# Patient Record
Sex: MALE | URBAN - METROPOLITAN AREA
[De-identification: names, ages, dates, MRNs, and addresses within clinical notes are randomized per-mention and may not be internally consistent; named-entity substitution may affect disease eponyms.]

---

## 2017-06-08 ENCOUNTER — APPOINTMENT (RX ONLY)
Dept: URBAN - METROPOLITAN AREA CLINIC 17 | Facility: CLINIC | Age: 57
Setting detail: DERMATOLOGY
End: 2017-06-08

## 2017-06-08 DIAGNOSIS — Z71.89 OTHER SPECIFIED COUNSELING: ICD-10-CM

## 2017-06-08 DIAGNOSIS — L57.0 ACTINIC KERATOSIS: ICD-10-CM

## 2017-06-08 DIAGNOSIS — D18.0 HEMANGIOMA: ICD-10-CM

## 2017-06-08 DIAGNOSIS — L82.1 OTHER SEBORRHEIC KERATOSIS: ICD-10-CM

## 2017-06-08 DIAGNOSIS — L57.8 OTHER SKIN CHANGES DUE TO CHRONIC EXPOSURE TO NONIONIZING RADIATION: ICD-10-CM

## 2017-06-08 PROBLEM — L70.0 ACNE VULGARIS: Status: ACTIVE | Noted: 2017-06-08

## 2017-06-08 PROBLEM — D18.01 HEMANGIOMA OF SKIN AND SUBCUTANEOUS TISSUE: Status: ACTIVE | Noted: 2017-06-08

## 2017-06-08 PROCEDURE — 17000 DESTRUCT PREMALG LESION: CPT

## 2017-06-08 PROCEDURE — ? LIQUID NITROGEN

## 2017-06-08 PROCEDURE — 17003 DESTRUCT PREMALG LES 2-14: CPT

## 2017-06-08 PROCEDURE — ? COUNSELING

## 2017-06-08 PROCEDURE — 99203 OFFICE O/P NEW LOW 30 MIN: CPT | Mod: 25

## 2017-06-08 ASSESSMENT — LOCATION SIMPLE DESCRIPTION DERM
LOCATION SIMPLE: RIGHT EAR
LOCATION SIMPLE: LEFT EAR

## 2017-06-08 ASSESSMENT — TOTAL NUMBER OF LESIONS: # OF LESIONS?: 2

## 2017-06-08 ASSESSMENT — LOCATION DETAILED DESCRIPTION DERM
LOCATION DETAILED: LEFT CRUS OF HELIX
LOCATION DETAILED: RIGHT CYMBA CONCHA

## 2017-06-08 ASSESSMENT — LOCATION ZONE DERM: LOCATION ZONE: EAR

## 2017-06-08 NOTE — PROCEDURE: LIQUID NITROGEN
Post-Care Instructions: LIQUID NITROGEN TREATMENT\\n(Cryosurgery)\\n\\n Liquid Nitrogen (LN2) is a cold, liquefied gas with a temperature of 196 degrees below zero Celsius (minus 321 degrees Fahrenheit).  It is used to freeze and destroy superficial skin growths such as warts and keratoses.  It can also be used to treat pre-malignant skin lesions known as actinic keratoses.  LN2 causes stinging and mild pain while the growth is being frozen and then thaws.  The discomfort usually lasts less than fifteen minutes.  On the fingers a throbbing pain will occasionally last  a few hours.\\n After LN2 treatment your skin will become swollen and red and it may blister.  Then a scab will form.  It will fall off by itself in one to three weeks.  The skin growth will come off with the scab, leaving healthy, new skin.\\n Generally, no special care is needed after liquid nitrogen treatment.  Just ignore it.  You can wash your skin as usual and use make-up or other cosmetics.  If clothing irritates the area, cover it with a small bandage (Band-Aid).\\n If a very large or uncomfortable blister develops you may open it with a sterilized needle.  The needle can be sterilized by wiping with rubbing alcohol.  Gently clean the blistered area with soap and water, followed by alcohol.  Insert the needle into the edge of the blister as needed to allow the blister contents to escape.  Press the blister gently to force out the fluid or blood.  This will reduce pain caused by blister fluid pressure.  If a blister re-forms it may be opened again.  Do not remove the top of the blister since its presence helps prevent infection until the new skin beneath can replace it.  Protect open sores or punctured blisters with Polysporin or Bacitracin antibiotic ointment (available without a prescription) under a Band-Aid for 24-48 hours.\\n If you notice any signs of infection such as oozing of a discolored substance (pus), spreading redness, excessive swelling or increased pain usually appearing 2 days or more after the office treatment, please call the office.  Your usual pain reliever (such as Tylenol and aspirin) is normally sufficient to alleviate discomfort.  Intermittent use of an ice pack for 5 to 10 minutes each hour can also be used if needed.  If you have severe pain, please call the office.  Do not pick at crusted areas.  Allow them to come off on their own.\\n If a lesion was treated near your eye, you may notice swelling of one or both lids within 24 hours, so that your lids are partially closed.  This is harmless, will not affect your vision, and will resolve by itself in a day or two.\\n Sometimes LN2 fails or does not completely remove the growth.  This is especially true with larger warts, which often require more than a single treatment for cure.  If the growth is not cured with LN2 and you do not have a scheduled follow-up appointment for further treatment, please call to make a return appointment.
Detail Level: Simple
Consent: The patient's consent was obtained including but not limited to risks of crusting, scabbing, blistering, scarring, darker or lighter pigmentary change, recurrence, incomplete removal and infection.
Total Number Of Aks Treated: 2
Duration Of Freeze Thaw-Cycle (Seconds): 5
Render Post-Care Instructions In Note?: no

## 2018-06-06 ENCOUNTER — APPOINTMENT (RX ONLY)
Dept: URBAN - METROPOLITAN AREA CLINIC 17 | Facility: CLINIC | Age: 58
Setting detail: DERMATOLOGY
End: 2018-06-06

## 2018-06-06 DIAGNOSIS — Z71.89 OTHER SPECIFIED COUNSELING: ICD-10-CM

## 2018-06-06 DIAGNOSIS — L82.1 OTHER SEBORRHEIC KERATOSIS: ICD-10-CM

## 2018-06-06 DIAGNOSIS — L81.4 OTHER MELANIN HYPERPIGMENTATION: ICD-10-CM

## 2018-06-06 DIAGNOSIS — L57.0 ACTINIC KERATOSIS: ICD-10-CM

## 2018-06-06 DIAGNOSIS — D18.0 HEMANGIOMA: ICD-10-CM

## 2018-06-06 PROBLEM — D18.01 HEMANGIOMA OF SKIN AND SUBCUTANEOUS TISSUE: Status: ACTIVE | Noted: 2018-06-06

## 2018-06-06 PROBLEM — L23.7 ALLERGIC CONTACT DERMATITIS DUE TO PLANTS, EXCEPT FOOD: Status: ACTIVE | Noted: 2018-06-06

## 2018-06-06 PROCEDURE — ? COUNSELING

## 2018-06-06 PROCEDURE — ? LIQUID NITROGEN

## 2018-06-06 PROCEDURE — 99213 OFFICE O/P EST LOW 20 MIN: CPT | Mod: 25

## 2018-06-06 PROCEDURE — 17000 DESTRUCT PREMALG LESION: CPT

## 2018-06-06 ASSESSMENT — LOCATION ZONE DERM: LOCATION ZONE: EAR

## 2018-06-06 ASSESSMENT — LOCATION SIMPLE DESCRIPTION DERM: LOCATION SIMPLE: LEFT EAR

## 2018-06-06 ASSESSMENT — TOTAL NUMBER OF LESIONS: # OF LESIONS?: 1

## 2018-06-06 ASSESSMENT — LOCATION DETAILED DESCRIPTION DERM: LOCATION DETAILED: LEFT CRUS OF HELIX

## 2018-06-06 NOTE — PROCEDURE: LIQUID NITROGEN
Render Post-Care Instructions In Note?: no
Post-Care Instructions: LIQUID NITROGEN TREATMENT\\n(Cryosurgery)\\n\\n Liquid Nitrogen (LN2) is a cold, liquefied gas with a temperature of 196 degrees below zero Celsius (minus 321 degrees Fahrenheit).  It is used to freeze and destroy superficial skin growths such as warts and keratoses.  It can also be used to treat pre-malignant skin lesions known as actinic keratoses.  LN2 causes stinging and mild pain while the growth is being frozen and then thaws.  The discomfort usually lasts less than fifteen minutes.  On the fingers a throbbing pain will occasionally last  a few hours.\\n After LN2 treatment your skin will become swollen and red and it may blister.  Then a scab will form.  It will fall off by itself in one to three weeks.  The skin growth will come off with the scab, leaving healthy, new skin.\\n Generally, no special care is needed after liquid nitrogen treatment.  Just ignore it.  You can wash your skin as usual and use make-up or other cosmetics.  If clothing irritates the area, cover it with a small bandage (Band-Aid).\\n If a very large or uncomfortable blister develops you may open it with a sterilized needle.  The needle can be sterilized by wiping with rubbing alcohol.  Gently clean the blistered area with soap and water, followed by alcohol.  Insert the needle into the edge of the blister as needed to allow the blister contents to escape.  Press the blister gently to force out the fluid or blood.  This will reduce pain caused by blister fluid pressure.  If a blister re-forms it may be opened again.  Do not remove the top of the blister since its presence helps prevent infection until the new skin beneath can replace it.  Protect open sores or punctured blisters with Polysporin or Bacitracin antibiotic ointment (available without a prescription) under a Band-Aid for 24-48 hours.\\n If you notice any signs of infection such as oozing of a discolored substance (pus), spreading redness, excessive swelling or increased pain usually appearing 2 days or more after the office treatment, please call the office.  Your usual pain reliever (such as Tylenol and aspirin) is normally sufficient to alleviate discomfort.  Intermittent use of an ice pack for 5 to 10 minutes each hour can also be used if needed.  If you have severe pain, please call the office.  Do not pick at crusted areas.  Allow them to come off on their own.\\n If a lesion was treated near your eye, you may notice swelling of one or both lids within 24 hours, so that your lids are partially closed.  This is harmless, will not affect your vision, and will resolve by itself in a day or two.\\n Sometimes LN2 fails or does not completely remove the growth.  This is especially true with larger warts, which often require more than a single treatment for cure.  If the growth is not cured with LN2 and you do not have a scheduled follow-up appointment for further treatment, please call to make a return appointment.
Detail Level: Simple
Duration Of Freeze Thaw-Cycle (Seconds): 5
Consent: The patient's consent was obtained including but not limited to risks of crusting, scabbing, blistering, scarring, darker or lighter pigmentary change, recurrence, incomplete removal and infection.
Total Number Of Aks Treated: 1

## 2019-07-18 ENCOUNTER — APPOINTMENT (RX ONLY)
Dept: URBAN - METROPOLITAN AREA CLINIC 17 | Facility: CLINIC | Age: 59
Setting detail: DERMATOLOGY
End: 2019-07-18

## 2019-07-18 DIAGNOSIS — L82.1 OTHER SEBORRHEIC KERATOSIS: ICD-10-CM

## 2019-07-18 DIAGNOSIS — Z71.89 OTHER SPECIFIED COUNSELING: ICD-10-CM

## 2019-07-18 DIAGNOSIS — L57.0 ACTINIC KERATOSIS: ICD-10-CM

## 2019-07-18 DIAGNOSIS — L81.4 OTHER MELANIN HYPERPIGMENTATION: ICD-10-CM

## 2019-07-18 DIAGNOSIS — L40.8 OTHER PSORIASIS: ICD-10-CM

## 2019-07-18 DIAGNOSIS — D18.0 HEMANGIOMA: ICD-10-CM

## 2019-07-18 PROBLEM — D18.01 HEMANGIOMA OF SKIN AND SUBCUTANEOUS TISSUE: Status: ACTIVE | Noted: 2019-07-18

## 2019-07-18 PROCEDURE — ? COUNSELING

## 2019-07-18 PROCEDURE — 99213 OFFICE O/P EST LOW 20 MIN: CPT | Mod: 25

## 2019-07-18 PROCEDURE — ? TREATMENT REGIMEN

## 2019-07-18 PROCEDURE — ? LIQUID NITROGEN

## 2019-07-18 PROCEDURE — ? DIAGNOSIS COMMENT

## 2019-07-18 PROCEDURE — 17000 DESTRUCT PREMALG LESION: CPT

## 2019-07-18 ASSESSMENT — LOCATION ZONE DERM
LOCATION ZONE: EAR
LOCATION ZONE: AXILLAE

## 2019-07-18 ASSESSMENT — LOCATION DETAILED DESCRIPTION DERM
LOCATION DETAILED: LEFT CYMBA CONCHA
LOCATION DETAILED: LEFT AXILLARY VAULT

## 2019-07-18 ASSESSMENT — TOTAL NUMBER OF LESIONS: # OF LESIONS?: 1

## 2019-07-18 ASSESSMENT — LOCATION SIMPLE DESCRIPTION DERM
LOCATION SIMPLE: LEFT AXILLARY VAULT
LOCATION SIMPLE: LEFT EAR

## 2019-07-18 NOTE — HPI: SECONDARY COMPLAINT
How Severe Is This Condition?: moderate
Additional History: Clears for 1-2 month(s) w OTC Hydrocortisone 1% cream , then recurs.  No similar lesions elsewhere.  No personal or family History of psoriasis.

## 2019-07-18 NOTE — PROCEDURE: LIQUID NITROGEN
Post-Care Instructions: LIQUID NITROGEN TREATMENT\\n(Cryosurgery)\\n\\n Liquid Nitrogen (LN2) is a cold, liquefied gas with a temperature of 196 degrees below zero Celsius (minus 321 degrees Fahrenheit).  It is used to freeze and destroy superficial skin growths such as warts and keratoses.  It can also be used to treat pre-malignant skin lesions known as actinic keratoses.  LN2 causes stinging and mild pain while the growth is being frozen and then thaws.  The discomfort usually lasts less than fifteen minutes.  On the fingers a throbbing pain will occasionally last  a few hours.\\n After LN2 treatment your skin will become swollen and red and it may blister.  Then a scab will form.  It will fall off by itself in one to three weeks.  The skin growth will come off with the scab, leaving healthy, new skin.\\n Generally, no special care is needed after liquid nitrogen treatment.  Just ignore it.  You can wash your skin as usual and use make-up or other cosmetics.  If clothing irritates the area, cover it with a small bandage (Band-Aid).\\n If a very large or uncomfortable blister develops you may open it with a sterilized needle.  The needle can be sterilized by wiping with rubbing alcohol.  Gently clean the blistered area with soap and water, followed by alcohol.  Insert the needle into the edge of the blister as needed to allow the blister contents to escape.  Press the blister gently to force out the fluid or blood.  This will reduce pain caused by blister fluid pressure.  If a blister re-forms it may be opened again.  Do not remove the top of the blister since its presence helps prevent infection until the new skin beneath can replace it.  Protect open sores or punctured blisters with Polysporin or Bacitracin antibiotic ointment (available without a prescription) under a Band-Aid for 24-48 hours.\\n If you notice any signs of infection such as oozing of a discolored substance (pus), spreading redness, excessive swelling or increased pain usually appearing 2 days or more after the office treatment, please call the office.  Your usual pain reliever (such as Tylenol and aspirin) is normally sufficient to alleviate discomfort.  Intermittent use of an ice pack for 5 to 10 minutes each hour can also be used if needed.  If you have severe pain, please call the office.  Do not pick at crusted areas.  Allow them to come off on their own.\\n If a lesion was treated near your eye, you may notice swelling of one or both lids within 24 hours, so that your lids are partially closed.  This is harmless, will not affect your vision, and will resolve by itself in a day or two.\\n Sometimes LN2 fails or does not completely remove the growth.  This is especially true with larger warts, which often require more than a single treatment for cure.  If the growth is not cured with LN2 and you do not have a scheduled follow-up appointment for further treatment, please call to make a return appointment.
Detail Level: Detailed
Number Of Freeze-Thaw Cycles: 2 freeze-thaw cycles
Consent: The patient's consent was obtained including but not limited to risks of crusting, scabbing, blistering, scarring, darker or lighter pigmentary change, recurrence, incomplete removal and infection.
Duration Of Freeze Thaw-Cycle (Seconds): 1
Render Post-Care Instructions In Note?: no

## 2019-07-18 NOTE — PROCEDURE: TREATMENT REGIMEN
Action 4: Continue
Plan: Continue OTC Hydrocortisone 1% cream prn flare Left axilla
Detail Level: Zone